# Patient Record
Sex: MALE | Race: WHITE
[De-identification: names, ages, dates, MRNs, and addresses within clinical notes are randomized per-mention and may not be internally consistent; named-entity substitution may affect disease eponyms.]

---

## 2018-02-13 ENCOUNTER — HOSPITAL ENCOUNTER (EMERGENCY)
Dept: HOSPITAL 17 - PHEFT | Age: 73
Discharge: HOME | End: 2018-02-13
Payer: MEDICARE

## 2018-02-13 VITALS
TEMPERATURE: 98.2 F | SYSTOLIC BLOOD PRESSURE: 111 MMHG | OXYGEN SATURATION: 96 % | DIASTOLIC BLOOD PRESSURE: 58 MMHG | RESPIRATION RATE: 18 BRPM | HEART RATE: 73 BPM

## 2018-02-13 VITALS — BODY MASS INDEX: 32.49 KG/M2 | HEIGHT: 69 IN | WEIGHT: 219.36 LBS

## 2018-02-13 DIAGNOSIS — I25.2: ICD-10-CM

## 2018-02-13 DIAGNOSIS — F41.9: ICD-10-CM

## 2018-02-13 DIAGNOSIS — Z85.46: ICD-10-CM

## 2018-02-13 DIAGNOSIS — K21.9: ICD-10-CM

## 2018-02-13 DIAGNOSIS — F32.9: ICD-10-CM

## 2018-02-13 DIAGNOSIS — Z95.5: ICD-10-CM

## 2018-02-13 DIAGNOSIS — J06.9: Primary | ICD-10-CM

## 2018-02-13 DIAGNOSIS — E78.00: ICD-10-CM

## 2018-02-13 PROCEDURE — 87880 STREP A ASSAY W/OPTIC: CPT

## 2018-02-13 PROCEDURE — 99283 EMERGENCY DEPT VISIT LOW MDM: CPT

## 2018-02-13 PROCEDURE — 87804 INFLUENZA ASSAY W/OPTIC: CPT

## 2018-02-13 PROCEDURE — 87081 CULTURE SCREEN ONLY: CPT

## 2018-02-13 NOTE — PD
HPI


Chief Complaint:  Cold / Flu Symptoms


Time Seen by Provider:  13:18


Travel History


International Travel<30 days:  No


Contact w/Intl Traveler<30days:  No


Traveled to known affect area:  No





History of Present Illness


HPI


73-year-old male presents to the emergency room for evaluation of cough and 

cold symptoms for the past week.  He has been taking over-the-counter Shawna-

[place] cold and flu without significant relief in symptoms.  States he has 

felt febrile 3 times within the past week but he did not actually take his 

temperature.  He has mild, nonproductive cough, sore throat, and congestion.  

His wife is sick with similar symptoms.  He has history of depression, 

hypercholesterolemia, and heart disease.





PFSH


Past Medical History


Hx Anticoagulant Therapy:  Yes (Plavix)


Arthritis:  Yes


Asthma:  No


Autoimmune Disease:  No


Blood Disorders:  No


Anxiety:  Yes


Depression:  Yes


Heart Rhythm Problems:  No


Cancer:  Yes (PROSTATE)


Cardiovascular Problems:  Yes (MI, D-Fib)


High Cholesterol:  Yes


Chemotherapy:  No


Chest Pain:  Yes


Congestive Heart Failure:  No


COPD:  No


Diabetes:  No


Diminished Hearing:  No


Endocrine:  No


Gastrointestinal Disorders:  Yes


GERD:  Yes


Glaucoma:  No


Genitourinary:  Yes


Hepatitis:  No


Hiatal Hernia:  Yes


Heparin Induced Thrombocytopen:  No


Hypertension:  No


Immune Disorder:  No


Implanted Vascular Access Dvce:  No


Kidney Stones:  Yes


Musculoskeletal:  Yes


Neurologic:  No


Psychiatric:  Yes


Reproductive:  No


Respiratory:  Yes (Comprimised Lungs)


Myocardial Infarction:  Yes


Radiation Therapy:  No


Renal Failure:  No


Sickle Cell Disease:  No


Sleep Apnea:  No


Thyroid Disease:  No


Ulcer:  No


Pregnant?:  Not Pregnant





Past Surgical History


Abdominal Surgery:  Yes (CHOLECYSTECTOMY)


AICD:  No


Appendectomy:  No


Arteriovenous Shunt:  No


Cardiac Surgery:  No


Cholecystectomy:  Yes


Coronary Stent:  Yes (3 STENTS )


Ear Surgery:  No


Endocrine Surgery:  No


Eye Surgery:  No


Genitourinary Surgery:  Yes (KIDNEY STONES, PROSTATECTOMY)


Gynecologic Surgery:  No


Insulin Pump:  No


Joint Replacement:  No


Oral Surgery:  No


Pacemaker:  No


Prostatectomy:  Yes


Thoracic Surgery:  No


Other Surgery:  Yes





Social History


Alcohol Use:  Yes (RARE)


Tobacco Use:  No


Substance Use:  No





Allergies-Medications


(Allergen,Severity, Reaction):  


Coded Allergies:  


     benazepril (Unverified  Allergy, Severe, Hives, 8/15/17)


     captopril (Unverified  Allergy, Severe, Hives, 8/15/17)


     enalaprilat (Unverified  Allergy, Severe, Hives, 8/15/17)


     fosinopril (Unverified  Allergy, Severe, Hives, 8/15/17)


     lisinopril (Unverified  Allergy, Severe, Hives, 8/15/17)


     quinapril (Unverified  Allergy, Severe, Hives, 8/15/17)


     atorvastatin (Unverified  Adverse Reaction, Intermediate, myalgia, 8/15/17)


Reported Meds & Prescriptions





Reported Meds & Active Scripts


Active


Reported


Vytorin (Ezetimibe-Simvastatin) 10-20 Mg Tab 1 Tab PO HS


Atacand (Candesartan Cilexetil) 8 Mg Tab 8 Mg PO DAILY


Coreg (Carvedilol) 6.25 Mg Tab 6.25 Mg PO BID


Motrin Ib (Ibuprofen) 200 Mg Tablet   


Plavix (Clopidogrel Bisulfate) 75 Mg Tab 75 Mg PO DAILY


Paxil (Paroxetine HCl) 10 Mg Tab 10 Mg PO DAILY


Mobic (Meloxicam) 7.5 Mg Tab 7.5 Mg PO DAILY


Aspirin 81 Mg Chew 81 Mg CHEW DAILY








Review of Systems


Except as stated in HPI:  all other systems reviewed are Neg





Physical Exam


Narrative


GENERAL: Well-nourished, well-developed male in no acute distress.  Afebrile.  

Ambulatory.


SKIN: Focused skin assessment warm/dry.


HEAD: Normocephalic.


EYES: No scleral icterus. No injection or drainage. 


NECK: Supple, trachea midline. No JVD or lymphadenopathy.


ENT: Mucosa pink and moist. No erythema or exudates. No uvular edema. No uvular

, palatal, or tonsillar deviation.  Airway patent. Nasal turbinates appear 

normal without nasal blood, purulent drainage or septal hematoma.


EARS: Bilateral pinnae and external canals appear within normal limits. 

Bilateral tympanic membranes without  erythema, dullness or perforation.


CARDIOVASCULAR: Regular rate and rhythm without murmurs, gallops, or rubs. 


RESPIRATORY: Breath sounds equal bilaterally. No accessory muscle use.  No 

crackles, rales, wheezes, or rhonchi.





Data


Data


Last Documented VS





Vital Signs








  Date Time  Temp Pulse Resp B/P (MAP) Pulse Ox O2 Delivery O2 Flow Rate FiO2


 


2/13/18 12:30 98.2 73 18 111/58 (75) 96   








Orders





 Orders


Group A Rapid Strep Screen (2/13/18 13:34)


Influenzae A/B Antigen (2/13/18 13:34)


Strep Culture (Group A) (2/13/18 13:48)


Ed Discharge Order (2/13/18 14:28)








[facility]


Medical Decision Making


Medical Screen Exam Complete:  Yes


Emergency Medical Condition:  Yes


Medical Record Reviewed:  Yes


Differential Diagnosis


Influenza, pneumonia, URI


Narrative Course


73-year-old male presents to the emergency room for evaluation of cold symptoms 

for the past week.  His wife is sick with similar symptoms.  Patient is 

afebrile and well-appearing in the emergency room.  Vital signs stable.  

Resting comfortably.  Lung sounds clear and equal bilaterally.  No significant 

erythema of the pharynx.  Rapid insulins and strep are negative.  This is viral 

URI.  Patient told to continue over-the-counter medications and follow-up with 

her primary care physician or return for worsening symptoms.  He understands 

and agrees to plan.





Diagnosis





 Primary Impression:  


 Viral URI


Referrals:  


Primary Care Physician





***Additional Instructions:  


Rest and drink plenty of fluids.


Continue over-the-counter cough and cold medication as directed.


Follow-up with a primary care physician.


Return to the emergency room for worsening symptoms.


***Med/Other Pt SpecificInfo:  Prescription(s) given


Disposition:  01 DISCHARGE HOME


Condition:  Stable











Rafia Weaver Feb 13, 2018 13:57

## 2018-03-12 ENCOUNTER — HOSPITAL ENCOUNTER (OUTPATIENT)
Dept: HOSPITAL 17 - CLAB | Age: 73
End: 2018-03-12
Attending: INTERNAL MEDICINE
Payer: MEDICARE

## 2018-03-12 DIAGNOSIS — Z79.899: ICD-10-CM

## 2018-03-12 DIAGNOSIS — I50.22: ICD-10-CM

## 2018-03-12 DIAGNOSIS — E78.2: ICD-10-CM

## 2018-03-12 DIAGNOSIS — I25.9: ICD-10-CM

## 2018-03-12 DIAGNOSIS — I25.10: Primary | ICD-10-CM

## 2018-03-12 LAB
ALBUMIN SERPL-MCNC: 3.6 GM/DL (ref 3.4–5)
ALP SERPL-CCNC: 100 U/L (ref 45–117)
ALT SERPL-CCNC: 33 U/L (ref 12–78)
AST SERPL-CCNC: 28 U/L (ref 15–37)
BILIRUB SERPL-MCNC: 0.4 MG/DL (ref 0.2–1)
BUN SERPL-MCNC: 31 MG/DL (ref 7–18)
CALCIUM SERPL-MCNC: 9.5 MG/DL (ref 8.5–10.1)
CHLORIDE SERPL-SCNC: 105 MEQ/L (ref 98–107)
CHOLEST SERPL-MCNC: 106 MG/DL (ref 120–200)
CHOLESTEROL/ HDL RATIO: 2.54 RATIO
CREAT SERPL-MCNC: 1.46 MG/DL (ref 0.6–1.3)
ERYTHROCYTE [DISTWIDTH] IN BLOOD BY AUTOMATED COUNT: 13.3 % (ref 11.6–17.2)
GFR SERPLBLD BASED ON 1.73 SQ M-ARVRAT: 47 ML/MIN (ref 89–?)
HCO3 BLD-SCNC: 29.4 MEQ/L (ref 21–32)
HCT VFR BLD CALC: 40.9 % (ref 39–51)
HDLC SERPL-MCNC: 41.7 MG/DL (ref 40–60)
HGB BLD-MCNC: 14.3 GM/DL (ref 13–17)
LDLC SERPL-MCNC: 29 MG/DL (ref 0–99)
MCH RBC QN AUTO: 31.1 PG (ref 27–34)
MCHC RBC AUTO-ENTMCNC: 35 % (ref 32–36)
MCV RBC AUTO: 89 FL (ref 80–100)
PLATELET # BLD: 201 TH/MM3 (ref 150–450)
PMV BLD AUTO: 9.6 FL (ref 7–11)
PROT SERPL-MCNC: 6.9 GM/DL (ref 6.4–8.2)
RBC # BLD AUTO: 4.6 MIL/MM3 (ref 4.5–5.9)
SODIUM SERPL-SCNC: 141 MEQ/L (ref 136–145)
TRIGL SERPL-MCNC: 176 MG/DL (ref 42–150)
WBC # BLD AUTO: 9.8 TH/MM3 (ref 4–11)

## 2018-03-12 PROCEDURE — 36415 COLL VENOUS BLD VENIPUNCTURE: CPT

## 2018-03-12 PROCEDURE — 80053 COMPREHEN METABOLIC PANEL: CPT

## 2018-03-12 PROCEDURE — 85027 COMPLETE CBC AUTOMATED: CPT

## 2018-03-12 PROCEDURE — 80061 LIPID PANEL: CPT
